# Patient Record
Sex: MALE | Race: WHITE | Employment: OTHER | URBAN - METROPOLITAN AREA
[De-identification: names, ages, dates, MRNs, and addresses within clinical notes are randomized per-mention and may not be internally consistent; named-entity substitution may affect disease eponyms.]

---

## 2022-06-26 LAB — AMB EXT COVID-19 RESULT: DETECTED

## 2022-06-28 ENCOUNTER — HOSPITAL ENCOUNTER (EMERGENCY)
Age: 87
Discharge: HOME | End: 2022-06-28
Attending: EMERGENCY MEDICINE
Payer: MEDICARE

## 2022-06-28 VITALS
TEMPERATURE: 99 F | HEART RATE: 68 BPM | RESPIRATION RATE: 18 BRPM | BODY MASS INDEX: 23.25 KG/M2 | OXYGEN SATURATION: 97 % | WEIGHT: 157 LBS | SYSTOLIC BLOOD PRESSURE: 124 MMHG | HEIGHT: 69 IN | DIASTOLIC BLOOD PRESSURE: 67 MMHG

## 2022-06-28 DIAGNOSIS — U07.1 COVID-19: Primary | ICD-10-CM

## 2022-06-28 PROCEDURE — 99284 EMERGENCY DEPT VISIT MOD MDM: CPT

## 2022-06-28 RX ORDER — AMLODIPINE BESYLATE AND BENAZEPRIL HYDROCHLORIDE 2.5; 1 MG/1; MG/1
1 CAPSULE ORAL DAILY
COMMUNITY

## 2022-06-28 RX ORDER — LISINOPRIL 40 MG/1
40 TABLET ORAL DAILY
COMMUNITY

## 2022-06-28 RX ORDER — BEBTELOVIMAB 87.5 MG/ML
175 INJECTION, SOLUTION INTRAVENOUS ONCE
Status: COMPLETED | OUTPATIENT
Start: 2022-06-28 | End: 2022-06-28

## 2022-06-28 RX ORDER — ASPIRIN 81 MG/1
TABLET ORAL DAILY
COMMUNITY

## 2022-06-28 RX ORDER — ATORVASTATIN CALCIUM 10 MG/1
10 TABLET, FILM COATED ORAL NIGHTLY
COMMUNITY

## 2022-06-28 NOTE — ED INITIAL ASSESSMENT (HPI)
Pt here visiting from [facility] started w symptoms on Saturday and dx w covid on Sunday. Pt feeling worse. Pt having sore throat.

## 2022-06-30 ENCOUNTER — TELEPHONE (OUTPATIENT)
Dept: CASE MANAGEMENT | Age: 87
End: 2022-06-30

## 2025-07-19 NOTE — DISCHARGE INSTRUCTIONS
Call the ENT specialist immediately and arrange follow-up for Monday.  Continue antibiotics.  Norco as needed for more severe pain.  I also recommend calling the provided orthopedic specialist to arrange follow-up.

## 2025-07-19 NOTE — ED INITIAL ASSESSMENT (HPI)
Pt fell directly on face and R shoulder, left knee on hard floor from standing. Pt lose his balance and was walking fast. No loc. Pt on baby asa.

## 2025-07-19 NOTE — ED PROVIDER NOTES
Patient Seen in: [facility] [place]        History  Chief Complaint   Patient presents with    Trauma     Stated Complaint: fall, landed directly on face, left knee and shoulder, on aspirin    Subjective:   HPI            89-year-old gentleman.  Arrives with family who assist with history and physical.  Just prior to arrival the patient was at the gym.  He was walking laps.  Unfortunately, he tripped and fell forward striking his left knee, right shoulder and face against the ground.  There was no loss of consciousness.  He did have a moderate episode of epistaxis.  Bilateral nares.  Both anterior and posterior bleeding.  He arrives complaining of facial pain and bruising.  He denies any dental injury or neck pain.  He is also complaining of moderate right shoulder pain and difficulty with range of motion.  He sustained abrasions to his left knee but is able to perform full range of motion and ambulate without difficulty.  He denies any hip pain.  He denies any injury to the chest wall or belly.  No shortness of breath.  This was a mechanical fall.  There is no lightheadedness or dizziness prior to the fall.  Normal recent health.  He feels well.      Objective:     No pertinent past medical history.            No pertinent past surgical history.              No pertinent social history.                              Physical Exam    ED Triage Vitals [07/18/25 2017]   BP (!) 163/79   Pulse 90   Resp 15   Temp 98.5 °F (36.9 °C)   Temp src    SpO2 95 %   O2 Device None (Room air)       Current Vitals:   Vital Signs  BP: (!) 163/79  Pulse: 90  Resp: 15  Temp: 98.5 °F (36.9 °C)    Oxygen Therapy  SpO2: 95 %  O2 Device: None (Room air)            Physical Exam         Gen: Well appearing, well groomed, alert and aware x 3  Neck: Supple, full range of motion, no thyromegaly or lymphadenopathy.  No cervical point tenderness.  Eye examination: EOMs are intact, normal conjunctival.  PERRLA.  No  hyphema  ENT: No osborne sign, raccoon sign or hemotympanum.  Bruising to the right malar region and right lateral orbit  Chest wall: No pain to palpation. No tent sign  Lung: No distress, RR, no retraction, breath sounds are clear bilaterally  Cardio: Regular rate and rhythm, normal S1-S2, no murmur appreciable  Abdominal: Soft exam.  No distention.  No fluid wave.  No pain to palpation all 4 quadrants  Extremities: Moderate pain to palpation to the right humeral neck and obvious bruising and swelling to the anterior shoulder.  Limited range of motion.  However, maintains excellent  strength and strong radial pulse.  Abrasions to the left knee.  Full active range of motion of both knee and the hip bilaterally.  Back: Full range of motion  Neuro: Cranial nerves intact (taste and smell omited), Normal Gait.    ED Course  Labs Reviewed - No data to display    CT FACIAL BONES (CPT=70486)  Result Date: 7/18/2025  PROCEDURE: CT FACIAL BONES (CPT=70486) INDICATIONS: fall, landed directly on face, left knee and shoulder, on aspirin PATIENT STATED HISTORY: Patient fell and landed on his face today. COMPARISON: None TECHNIQUE:  Noncontrast CT imaging was performed through the facial bones. 3D shaded surface renderings are created on an independent CT scanner workstation. Dose reduction techniques were used. Dose information is transmitted to the ACR (American College of Radiology) NRDR (National Radiology Data Registry) which includes the Dose Index Registry. FINDINGS: SINUSES: Acute fractures involving the anterior, posterior, and medial walls of the right maxillary sinus with high density secretions in the right maxillary sinus noted. Multiple fractures involving the right orbital floor traversing the infraorbital nerve canal are noted. NASAL FOSSA: No mass or fracture. SKULL BASE: No mass or bone destruction. FACIAL BONES: See above ORBITS: Fracture involving the right would've floor traversing the infraorbital nerve  foramen is noted. There is mild thickening of the right inferior rectus. No evidence of entrapment. Right periorbital hematoma is noted. CAVERNOUS SINUS: Symmetric appearance with no visible lesion. SALIVARY GLANDS: The parotid and submandibular glands are unremarkable. OTHER: High density secretions are noted in the oropharynx. No lymphadenopathy.     CONCLUSION: Multiple fractures involving the right orbital floor, and anterior and posterior walls of the right maxillary sinus as described above. Right periorbital hematoma is noted. Electronically Verified and Signed by Attending Radiologist: Sae Landry MD 7/18/2025 10:05 PM Workstation: [id number]    XR KNEE (1 OR 2 VIEWS), LEFT (CPT=73560)  Result Date: 7/18/2025  PROCEDURE: XR KNEE (1 OR 2 VIEWS), LEFT (CPT=73560) INDICATIONS: fall, landed directly on face, left knee and shoulder, on aspirin PATIENT STATED HISTORY: Patient complains of pain throughout the left knee with abrasions and redness on the anterior aspect after falling forward onto a gym floor over 2 hours ago. Patient denies surgery. COMPARISON: None TECHNIQUE: 2 views of the left knee were obtained. FINDINGS: Osseous structures are intact. Joint spaces are preserved.. No significant joint effusion. Superior patellar enthesopathy. Vascular calcifications.     CONCLUSION: Osseous structures are intact. Electronically Verified and Signed by Attending Radiologist: Supriya Mai MD 7/18/2025 9:58 PM Workstation: [id number]    CT BRAIN OR HEAD (CPT=70450)  Result Date: 7/18/2025  PROCEDURE: CT BRAIN OR HEAD (CPT=70450) INDICATIONS: fall, landed directly on face, left knee and shoulder, on aspirin COMPARISON: There are no comparisons for this exam. TECHNIQUE: Noncontrast CT scanning is performed through the brain. Automated exposure control techniques for dose reduction were used. Dose information is transmitted to the ACR (American College of Radiology) NRDR (National Radiology Data Registry) which includes  the Dose Index Registry. FINDINGS: VENTRICLES/SULCI: Diffuse volume loss is noted. Ventricles are within normal limits. INTRACRANIAL:       There are no abnormal extraaxial fluid collections.  There is no midline shift. Hypodensities throughout the periventricular and subcortical white matter is compatible with chronic small vessel disease. There is no specific evidence of an acute infarct.  There is no hemorrhage or mass lesion. SINUSES:                 No sign of acute sinusitis. MASTOIDS:             No sign of acute inflammation. SKULL:                    No evidence for fracture or osseous abnormality. OTHER:                    None     CONCLUSION: 1.  No acute intracranial abnormality Electronically Verified and Signed by Attending Radiologist: Sae Landry MD 7/18/2025 9:52 PM Workstation: [id number]    XR SHOULDER, COMPLETE (MIN 2 VIEWS), RIGHT (CPT=73030)  Result Date: 7/18/2025  PROCEDURE: XR SHOULDER, COMPLETE (MIN 2 VIEWS), RIGHT (CPT=73030) INDICATIONS: fall, landed directly on face, left knee and shoulder, on aspirin PATIENT STATED HISTORY: Patient complains of pain throughout the right shoulder after falling forward over 2 hours ago onto a gym floor. Patient denies surgery. COMPARISON: None TECHNIQUE: 4 views of the shoulder were obtained. FINDINGS: No acute fractures or osseous lesions. Acromioclavicular joint space narrowing with marginal osteophytes is most consistent with degenerative change. Glenohumeral relationship is within normal limits. No dislocation.     CONCLUSION: Acromioclavicular joint arthropathy. Electronically Verified and Signed by Attending Radiologist: Supriya Mai MD 7/18/2025 9:48 PM Workstation: [id number]                      MDM     Contusion to the right anterior shoulder with moderate pain to palpation through the humeral neck.  Limited range of motion.  Distal extremity neurovascularly intact    Multiple large abrasions to the left knee but patient demonstrates full active  range of motion of both the knee and the hip.    No chest wall pain to palpation.    Large bruise to the right malar region that extends into the lateral orbit.  EOMs are intact.  No Zamudio sign, raccoon sign or hemotympanum.  Evidence of recent epistaxis but no septal hematoma.  No obvious dental injury    Supple full range of motion of the neck.  No cervical point tenderness.    Patient sent for CT of the brain and facial bones.  Plain films of the right shoulder.  Plain films of the left knee.    He is not on blood thinners.  Takes a daily baby aspirin.    knee:  CONCLUSION: Osseous structures are intact. Electronically Verified and Signed by Attending Radiologist: Supriya Mai MD 7/18/2025 9:58 PM Workstation: [id number]    Shoulder:  CONCLUSION: Acromioclavicular joint arthropathy. Electronically Verified and Signed by Attending Radiologist: Supriya Mai MD 7/18/2025 9:48 PM Workstation: [id number]  CONCLUSION: Multiple fractures involving the right orbital floor, and anterior and posterior walls of the right maxillary sinus as described above. Right periorbital hematoma is noted. Electronically Verified and Signed by Attending Radiologist: Sae Landry MD 7/18/2025 10:05 PM Workstation: [id number]  CONCLUSION: 1.  No acute intracranial abnormality Electronically Verified and Signed by Attending Radiologist: Sae Landry MD 7/18/2025 9:52 PM Workstation: [id number]    All imaging reviewed as above.  Patient placed on oral antibiotics for the orbital floor fracture/maxillary fractures.  Will refer to ENT.  He continues to have no complaint of visual acuity loss, eye pain, difficulty with eye movement.  His EOMs are intact.  PERRLA bilaterally.  No hyphema.  Oral analgesics.  Patient placed in a sling.  Also recommend orthopedic follow-up for further evaluation of shoulder injury.  Call the ENT specialist immediately and arrange follow-up for Monday.  Continue antibiotics.  Norco as needed for more severe pain.  I  also recommend calling the provided orthopedic specialist to arrange follow-up.    Medical Decision Making      Disposition and Plan     Clinical Impression:  1. Closed fracture of right orbital floor, initial encounter ([facility])    2. Closed fracture of right side of maxilla, initial encounter ([facility])    3. Injury of right shoulder, initial encounter         Disposition:  There is no disposition on file for this visit.  There is no disposition time on file for this visit.    Follow-up:  Mark Sarkar MD  [address]  [address]  [place] [address]  [phone]    Follow up      Jaun Freeman MD  [address] [place] [address]  [place] [id number] [phone]    Follow up            Medications Prescribed:  Current Discharge Medication List        START taking these medications    Details   cephALEXin 500 MG Oral Cap Take 1 capsule (500 mg total) by mouth 4 (four) times daily for 7 days.  Qty: 28 capsule, Refills: 0      HYDROcodone-acetaminophen 5-325 MG Oral Tab Take 1-2 tablets by mouth every 6 (six) hours as needed for Pain.  Qty: 10 tablet, Refills: 0    Associated Diagnoses: Closed fracture of right orbital floor, initial encounter ([facility])                   Supplementary Documentation: